# Patient Record
Sex: FEMALE | Race: WHITE | Employment: FULL TIME | URBAN - NONMETROPOLITAN AREA
[De-identification: names, ages, dates, MRNs, and addresses within clinical notes are randomized per-mention and may not be internally consistent; named-entity substitution may affect disease eponyms.]

---

## 2017-02-14 ENCOUNTER — OFFICE VISIT (OUTPATIENT)
Dept: ORTHOPEDIC SURGERY | Age: 52
End: 2017-02-14

## 2017-02-14 VITALS
BODY MASS INDEX: 33.77 KG/M2 | WEIGHT: 172 LBS | SYSTOLIC BLOOD PRESSURE: 118 MMHG | HEIGHT: 60 IN | HEART RATE: 66 BPM | DIASTOLIC BLOOD PRESSURE: 80 MMHG

## 2017-02-14 DIAGNOSIS — M23.92 INTERNAL DERANGEMENT OF LEFT KNEE: Primary | ICD-10-CM

## 2017-02-14 PROCEDURE — 99212 OFFICE O/P EST SF 10 MIN: CPT | Performed by: PHYSICIAN ASSISTANT

## 2017-02-28 ENCOUNTER — TELEPHONE (OUTPATIENT)
Dept: ORTHOPEDIC SURGERY | Age: 52
End: 2017-02-28

## 2017-03-06 ENCOUNTER — OFFICE VISIT (OUTPATIENT)
Dept: ORTHOPEDIC SURGERY | Age: 52
End: 2017-03-06

## 2017-03-06 VITALS — BODY MASS INDEX: 33.77 KG/M2 | HEIGHT: 60 IN | WEIGHT: 172 LBS

## 2017-03-06 DIAGNOSIS — M23.92 INTERNAL DERANGEMENT OF LEFT KNEE: Primary | ICD-10-CM

## 2017-03-06 DIAGNOSIS — Z01.818 PRE-OP EXAMINATION: Primary | ICD-10-CM

## 2017-03-06 DIAGNOSIS — Z01.818 PRE-OP EXAMINATION: ICD-10-CM

## 2017-03-06 PROCEDURE — 93000 ELECTROCARDIOGRAM COMPLETE: CPT | Performed by: INTERNAL MEDICINE

## 2017-03-06 PROCEDURE — 99213 OFFICE O/P EST LOW 20 MIN: CPT | Performed by: PHYSICIAN ASSISTANT

## 2017-03-24 ENCOUNTER — EVALUATION (OUTPATIENT)
Dept: PHYSICAL THERAPY | Age: 52
End: 2017-03-24
Payer: COMMERCIAL

## 2017-03-24 DIAGNOSIS — M25.562 LEFT KNEE PAIN, UNSPECIFIED CHRONICITY: Primary | ICD-10-CM

## 2017-03-24 PROCEDURE — 97014 ELECTRIC STIMULATION THERAPY: CPT | Performed by: PHYSICAL THERAPIST

## 2017-03-24 PROCEDURE — 97110 THERAPEUTIC EXERCISES: CPT | Performed by: PHYSICAL THERAPIST

## 2017-03-24 PROCEDURE — 97162 PT EVAL MOD COMPLEX 30 MIN: CPT | Performed by: PHYSICAL THERAPIST

## 2017-03-24 ASSESSMENT — PAIN DESCRIPTION - PAIN TYPE: TYPE: ACUTE PAIN

## 2017-03-24 ASSESSMENT — PAIN DESCRIPTION - ONSET: ONSET: SUDDEN

## 2017-03-24 ASSESSMENT — PAIN DESCRIPTION - ORIENTATION: ORIENTATION: LEFT;LOWER;MID;OUTER

## 2017-03-24 ASSESSMENT — PAIN DESCRIPTION - LOCATION: LOCATION: KNEE

## 2017-03-24 ASSESSMENT — PAIN SCALES - GENERAL: PAINLEVEL_OUTOF10: 6

## 2017-03-24 ASSESSMENT — PAIN DESCRIPTION - DESCRIPTORS: DESCRIPTORS: ACHING

## 2017-03-24 ASSESSMENT — PAIN DESCRIPTION - FREQUENCY: FREQUENCY: INTERMITTENT

## 2017-03-24 ASSESSMENT — PAIN DESCRIPTION - PROGRESSION: CLINICAL_PROGRESSION: NOT CHANGED

## 2017-03-27 ENCOUNTER — TREATMENT (OUTPATIENT)
Dept: PHYSICAL THERAPY | Age: 52
End: 2017-03-27
Payer: COMMERCIAL

## 2017-03-27 DIAGNOSIS — M25.562 LEFT KNEE PAIN, UNSPECIFIED CHRONICITY: Primary | ICD-10-CM

## 2017-03-27 PROCEDURE — 97110 THERAPEUTIC EXERCISES: CPT | Performed by: PHYSICAL THERAPIST

## 2017-03-27 PROCEDURE — 97014 ELECTRIC STIMULATION THERAPY: CPT | Performed by: PHYSICAL THERAPIST

## 2017-03-31 ENCOUNTER — TREATMENT (OUTPATIENT)
Dept: PHYSICAL THERAPY | Age: 52
End: 2017-03-31
Payer: COMMERCIAL

## 2017-03-31 DIAGNOSIS — M25.562 LEFT KNEE PAIN, UNSPECIFIED CHRONICITY: Primary | ICD-10-CM

## 2017-03-31 PROCEDURE — 97110 THERAPEUTIC EXERCISES: CPT | Performed by: PHYSICAL THERAPIST

## 2017-04-04 ENCOUNTER — TREATMENT (OUTPATIENT)
Dept: PHYSICAL THERAPY | Age: 52
End: 2017-04-04
Payer: COMMERCIAL

## 2017-04-04 DIAGNOSIS — M25.562 LEFT KNEE PAIN, UNSPECIFIED CHRONICITY: Primary | ICD-10-CM

## 2017-04-04 PROCEDURE — 97110 THERAPEUTIC EXERCISES: CPT | Performed by: PHYSICAL THERAPIST

## 2017-04-11 ENCOUNTER — TREATMENT (OUTPATIENT)
Dept: PHYSICAL THERAPY | Age: 52
End: 2017-04-11
Payer: COMMERCIAL

## 2017-04-11 DIAGNOSIS — M25.562 LEFT KNEE PAIN, UNSPECIFIED CHRONICITY: Primary | ICD-10-CM

## 2017-04-11 PROCEDURE — 97110 THERAPEUTIC EXERCISES: CPT | Performed by: PHYSICAL THERAPIST

## 2017-04-18 ENCOUNTER — TREATMENT (OUTPATIENT)
Dept: PHYSICAL THERAPY | Age: 52
End: 2017-04-18
Payer: COMMERCIAL

## 2017-04-18 DIAGNOSIS — M25.562 LEFT KNEE PAIN, UNSPECIFIED CHRONICITY: Primary | ICD-10-CM

## 2017-04-18 PROCEDURE — 97110 THERAPEUTIC EXERCISES: CPT | Performed by: PHYSICAL THERAPIST

## 2017-04-18 PROCEDURE — 97112 NEUROMUSCULAR REEDUCATION: CPT | Performed by: PHYSICAL THERAPIST

## 2017-05-02 ENCOUNTER — OFFICE VISIT (OUTPATIENT)
Dept: ORTHOPEDIC SURGERY | Age: 52
End: 2017-05-02

## 2017-05-02 VITALS — HEIGHT: 60 IN | WEIGHT: 170 LBS | BODY MASS INDEX: 33.38 KG/M2

## 2017-05-02 DIAGNOSIS — Z98.890 H/O ARTHROSCOPY OF LEFT KNEE: Primary | ICD-10-CM

## 2017-05-02 PROCEDURE — 99024 POSTOP FOLLOW-UP VISIT: CPT | Performed by: PHYSICIAN ASSISTANT

## 2017-05-02 RX ORDER — METHYLPREDNISOLONE 4 MG/1
4 TABLET ORAL SEE ADMIN INSTRUCTIONS
Qty: 1 KIT | Refills: 0 | Status: SHIPPED | OUTPATIENT
Start: 2017-05-02 | End: 2017-05-08

## 2018-01-18 DIAGNOSIS — M25.562 LEFT KNEE PAIN, UNSPECIFIED CHRONICITY: Primary | ICD-10-CM

## 2018-01-23 ENCOUNTER — OFFICE VISIT (OUTPATIENT)
Dept: ORTHOPEDIC SURGERY | Age: 53
End: 2018-01-23
Payer: COMMERCIAL

## 2018-01-23 ENCOUNTER — HOSPITAL ENCOUNTER (OUTPATIENT)
Dept: GENERAL RADIOLOGY | Age: 53
Discharge: HOME | End: 2018-01-23
Payer: COMMERCIAL

## 2018-01-23 VITALS
HEIGHT: 60 IN | BODY MASS INDEX: 35.89 KG/M2 | DIASTOLIC BLOOD PRESSURE: 78 MMHG | HEART RATE: 80 BPM | SYSTOLIC BLOOD PRESSURE: 107 MMHG | WEIGHT: 182.8 LBS

## 2018-01-23 DIAGNOSIS — M17.12 PRIMARY OSTEOARTHRITIS OF LEFT KNEE: Primary | ICD-10-CM

## 2018-01-23 DIAGNOSIS — M25.562 LEFT KNEE PAIN, UNSPECIFIED CHRONICITY: ICD-10-CM

## 2018-01-23 PROCEDURE — 20610 DRAIN/INJ JOINT/BURSA W/O US: CPT | Performed by: NURSE PRACTITIONER

## 2018-01-23 PROCEDURE — 73562 X-RAY EXAM OF KNEE 3: CPT

## 2018-01-23 PROCEDURE — 99212 OFFICE O/P EST SF 10 MIN: CPT | Performed by: NURSE PRACTITIONER

## 2018-01-23 RX ORDER — BUPIVACAINE HYDROCHLORIDE 5 MG/ML
5 INJECTION, SOLUTION PERINEURAL ONCE
Status: COMPLETED | OUTPATIENT
Start: 2018-01-23 | End: 2018-01-23

## 2018-01-23 RX ORDER — TRIAMCINOLONE ACETONIDE 40 MG/ML
80 INJECTION, SUSPENSION INTRA-ARTICULAR; INTRAMUSCULAR ONCE
Status: COMPLETED | OUTPATIENT
Start: 2018-01-23 | End: 2018-01-23

## 2018-01-23 RX ORDER — ALBUTEROL SULFATE 90 UG/1
2 AEROSOL, METERED RESPIRATORY (INHALATION) EVERY 6 HOURS PRN
COMMUNITY
End: 2018-08-09

## 2018-01-23 RX ADMIN — TRIAMCINOLONE ACETONIDE 80 MG: 40 INJECTION, SUSPENSION INTRA-ARTICULAR; INTRAMUSCULAR at 09:34

## 2018-01-23 RX ADMIN — BUPIVACAINE HYDROCHLORIDE 25 MG: 5 INJECTION, SOLUTION PERINEURAL at 09:34

## 2018-04-24 ENCOUNTER — OFFICE VISIT (OUTPATIENT)
Dept: ORTHOPEDIC SURGERY | Age: 53
End: 2018-04-24
Payer: COMMERCIAL

## 2018-04-24 VITALS
SYSTOLIC BLOOD PRESSURE: 130 MMHG | WEIGHT: 182 LBS | HEIGHT: 60 IN | HEART RATE: 74 BPM | DIASTOLIC BLOOD PRESSURE: 84 MMHG | BODY MASS INDEX: 35.73 KG/M2

## 2018-04-24 DIAGNOSIS — M17.12 PRIMARY OSTEOARTHRITIS OF LEFT KNEE: Primary | ICD-10-CM

## 2018-04-24 PROCEDURE — 99212 OFFICE O/P EST SF 10 MIN: CPT | Performed by: PHYSICIAN ASSISTANT

## 2018-04-24 PROCEDURE — 20610 DRAIN/INJ JOINT/BURSA W/O US: CPT | Performed by: PHYSICIAN ASSISTANT

## 2018-04-24 RX ORDER — BUPIVACAINE HYDROCHLORIDE 5 MG/ML
5 INJECTION, SOLUTION PERINEURAL ONCE
Status: COMPLETED | OUTPATIENT
Start: 2018-04-24 | End: 2018-04-24

## 2018-04-24 RX ORDER — TRIAMCINOLONE ACETONIDE 40 MG/ML
80 INJECTION, SUSPENSION INTRA-ARTICULAR; INTRAMUSCULAR ONCE
Status: COMPLETED | OUTPATIENT
Start: 2018-04-24 | End: 2018-04-24

## 2018-04-24 RX ADMIN — TRIAMCINOLONE ACETONIDE 80 MG: 40 INJECTION, SUSPENSION INTRA-ARTICULAR; INTRAMUSCULAR at 08:09

## 2018-04-24 RX ADMIN — BUPIVACAINE HYDROCHLORIDE 25 MG: 5 INJECTION, SOLUTION PERINEURAL at 08:09

## 2018-08-06 ENCOUNTER — OFFICE VISIT (OUTPATIENT)
Dept: PRIMARY CARE CLINIC | Age: 53
End: 2018-08-06
Payer: COMMERCIAL

## 2018-08-06 VITALS
SYSTOLIC BLOOD PRESSURE: 130 MMHG | WEIGHT: 176 LBS | BODY MASS INDEX: 34.55 KG/M2 | OXYGEN SATURATION: 99 % | HEIGHT: 60 IN | RESPIRATION RATE: 18 BRPM | DIASTOLIC BLOOD PRESSURE: 88 MMHG | HEART RATE: 98 BPM

## 2018-08-06 DIAGNOSIS — R11.0 NAUSEA: ICD-10-CM

## 2018-08-06 DIAGNOSIS — G43.009 MIGRAINE WITHOUT AURA AND WITHOUT STATUS MIGRAINOSUS, NOT INTRACTABLE: Primary | ICD-10-CM

## 2018-08-06 PROCEDURE — 99213 OFFICE O/P EST LOW 20 MIN: CPT | Performed by: NURSE PRACTITIONER

## 2018-08-06 PROCEDURE — 96372 THER/PROPH/DIAG INJ SC/IM: CPT | Performed by: NURSE PRACTITIONER

## 2018-08-06 RX ORDER — PROMETHAZINE HYDROCHLORIDE 25 MG/ML
12.5 INJECTION, SOLUTION INTRAMUSCULAR; INTRAVENOUS ONCE
Status: COMPLETED | OUTPATIENT
Start: 2018-08-06 | End: 2018-08-06

## 2018-08-06 RX ORDER — KETOROLAC TROMETHAMINE 30 MG/ML
30 INJECTION, SOLUTION INTRAMUSCULAR; INTRAVENOUS ONCE
Status: COMPLETED | OUTPATIENT
Start: 2018-08-06 | End: 2018-08-06

## 2018-08-06 RX ADMIN — PROMETHAZINE HYDROCHLORIDE 12.5 MG: 25 INJECTION, SOLUTION INTRAMUSCULAR; INTRAVENOUS at 12:11

## 2018-08-06 RX ADMIN — KETOROLAC TROMETHAMINE 30 MG: 30 INJECTION, SOLUTION INTRAMUSCULAR; INTRAVENOUS at 12:10

## 2018-08-06 ASSESSMENT — PATIENT HEALTH QUESTIONNAIRE - PHQ9
2. FEELING DOWN, DEPRESSED OR HOPELESS: 0
SUM OF ALL RESPONSES TO PHQ9 QUESTIONS 1 & 2: 0
1. LITTLE INTEREST OR PLEASURE IN DOING THINGS: 0
SUM OF ALL RESPONSES TO PHQ QUESTIONS 1-9: 0

## 2018-08-06 ASSESSMENT — ENCOUNTER SYMPTOMS
SINUS PRESSURE: 0
RHINORRHEA: 0
PHOTOPHOBIA: 1
SORE THROAT: 0
VOMITING: 1
ABDOMINAL PAIN: 0
RESPIRATORY NEGATIVE: 1
NAUSEA: 1

## 2018-08-06 NOTE — PROGRESS NOTES
Exam   Constitutional: She is oriented to person, place, and time. She appears well-developed and well-nourished. HENT:   Head: Normocephalic. Right Ear: External ear and ear canal normal. A middle ear effusion is present. Left Ear: External ear and ear canal normal. A middle ear effusion is present. Nose: Mucosal edema and rhinorrhea present. Right sinus exhibits no maxillary sinus tenderness and no frontal sinus tenderness. Left sinus exhibits no maxillary sinus tenderness and no frontal sinus tenderness. Mouth/Throat: Uvula is midline, oropharynx is clear and moist and mucous membranes are normal.   Eyes: Pupils are equal, round, and reactive to light. Neck: Normal range of motion. Neck supple. Cardiovascular: Normal rate, regular rhythm and normal heart sounds. Pulmonary/Chest: Effort normal and breath sounds normal.   Lymphadenopathy:     She has no cervical adenopathy. Neurological: She is alert and oriented to person, place, and time. GCS eye subscore is 4. GCS verbal subscore is 5. GCS motor subscore is 6. Skin: Skin is warm and dry. Psychiatric: She has a normal mood and affect. Her speech is normal and behavior is normal. Judgment and thought content normal. Cognition and memory are normal.   Nursing note and vitals reviewed. Assessment and Plan:     Diagnosis Orders   1. Migraine without aura and without status migrainosus, not intractable  promethazine (PHENERGAN) injection 12.5 mg    ketorolac (TORADOL) injection 30 mg   2. Nausea  promethazine (PHENERGAN) injection 12.5 mg     I suggested she take Excedrin migraine, tylenol, or ibuprofen at the start of a headache. Take Zyrtec daily for 3 weeks. I suggested to continue routine medications. Follow up with PCP if symptoms do not improve or worsen.       Electronically signed by PJ Frank CNP on 8/6/18 at 11:47 AM

## 2018-08-06 NOTE — LETTER
[facility]  [place]  Phone: [phone]  Fax: [phone]    PJ Frank CNP        August 6, 2018     Patient: Parrish Malone   YOB: 1965   Date of Visit: 8/6/2018       To Whom it May Concern:    Jose Rivera was seen in my clinic on 8/6/2018. She may return to work on 08/07/2018. Please excuse for 08/06/2018. If you have any questions or concerns, please don't hesitate to call.     Sincerely,         PJ Frank CNP

## 2018-08-06 NOTE — PATIENT INSTRUCTIONS
Patient Education        Migraine Headache: Care Instructions  Your Care Instructions  Migraines are painful, throbbing headaches that often start on one side of the head. They may cause nausea and vomiting and make you sensitive to light, sound, or smell. Without treatment, migraines can last from 4 hours to a few days. Medicines can help prevent migraines or stop them after they have started. Your doctor can help you find which ones work best for you. Follow-up care is a key part of your treatment and safety. Be sure to make and go to all appointments, and call your doctor if you are having problems. It's also a good idea to know your test results and keep a list of the medicines you take. How can you care for yourself at home? · Do not drive if you have taken a prescription pain medicine. · Rest in a quiet, dark room until your headache is gone. Close your eyes, and try to relax or go to sleep. Don't watch TV or read. · Put a cold, moist cloth or cold pack on the painful area for 10 to 20 minutes at a time. Put a thin cloth between the cold pack and your skin. · Use a warm, moist towel or a heating pad set on low to relax tight shoulder and neck muscles. · Have someone gently massage your neck and shoulders. · Take your medicines exactly as prescribed. Call your doctor if you think you are having a problem with your medicine. You will get more details on the specific medicines your doctor prescribes. · Be careful not to take pain medicine more often than the instructions allow. You could get worse or more frequent headaches when the medicine wears off. To prevent migraines  · Keep a headache diary so you can figure out what triggers your headaches. Avoiding triggers may help you prevent headaches. Record when each headache began, how long it lasted, and what the pain was like.  (Was it throbbing, aching, stabbing, or dull?) Write down any other symptoms you had with the headache, such as nausea, flashing lights or dark spots, or sensitivity to bright light or loud noise. Note if the headache occurred near your period. List anything that might have triggered the headache. Triggers may include certain foods (chocolate, cheese, wine) or odors, smoke, bright light, stress, or lack of sleep. · If your doctor has prescribed medicine for your migraines, take it as directed. You may have medicine that you take only when you get a migraine and medicine that you take all the time to help prevent migraines. ¨ If your doctor has prescribed medicine for when you get a headache, take it at the first sign of a migraine, unless your doctor has given you other instructions. ¨ If your doctor has prescribed medicine to prevent migraines, take it exactly as prescribed. Call your doctor if you think you are having a problem with your medicine. · Find healthy ways to deal with stress. Migraines are most common during or right after stressful times. Take time to relax before and after you do something that has caused a migraine in the past.  · Try to keep your muscles relaxed by keeping good posture. Check your jaw, face, neck, and shoulder muscles for tension. Try to relax them. When you sit at a desk, change positions often. And make sure to stretch for 30 seconds each hour. · Get plenty of sleep and exercise. · Eat meals on a regular schedule. Avoid foods and drinks that often trigger migraines. These include chocolate, alcohol (especially red wine and port), aspartame, monosodium glutamate (MSG), and some additives found in foods (such as hot dogs, fry, cold cuts, aged cheeses, and pickled foods). · Limit caffeine. Don't drink too much coffee, tea, or soda. But don't quit caffeine suddenly. That can also give you migraines. · Do not smoke or allow others to smoke around you. If you need help quitting, talk to your doctor about stop-smoking programs and medicines.  These can increase your chances of quitting for good.  · If you are taking birth control pills or hormone therapy, talk to your doctor about whether they are triggering your migraines. When should you call for help? Call 911 anytime you think you may need emergency care. For example, call if:    · You have signs of a stroke. These may include:  ¨ Sudden numbness, paralysis, or weakness in your face, arm, or leg, especially on only one side of your body. ¨ Sudden vision changes. ¨ Sudden trouble speaking. ¨ Sudden confusion or trouble understanding simple statements. ¨ Sudden problems with walking or balance. ¨ A sudden, severe headache that is different from past headaches.    Call your doctor now or seek immediate medical care if:    · You have new or worse nausea and vomiting.     · You have a new or higher fever.     · Your headache gets much worse.    Watch closely for changes in your health, and be sure to contact your doctor if:    · You are not getting better after 2 days (48 hours). Where can you learn more? Go to [email or web address]://[email or web address] org and sign in to your [employer] account. Enter Y126 in the Antidot box to learn more about \"Migraine Headache: Care Instructions. \"     If you do not have an account, please click on the \"Sign Up Now\" link. Current as of: October 9, 2017  Content Version: 11.6  © 3222-0564 [employer]. Care instructions adapted under license by [facility] ([facility]). If you have questions about a medical condition or this instruction, always ask your healthcare professional. [place] [address] any warranty or liability for your use of this information.

## 2018-08-09 ENCOUNTER — OFFICE VISIT (OUTPATIENT)
Dept: FAMILY MEDICINE CLINIC | Age: 53
End: 2018-08-09
Payer: COMMERCIAL

## 2018-08-09 VITALS
OXYGEN SATURATION: 97 % | DIASTOLIC BLOOD PRESSURE: 80 MMHG | BODY MASS INDEX: 35.14 KG/M2 | HEART RATE: 80 BPM | WEIGHT: 179 LBS | SYSTOLIC BLOOD PRESSURE: 118 MMHG | HEIGHT: 60 IN

## 2018-08-09 DIAGNOSIS — F32.A DEPRESSION, UNSPECIFIED DEPRESSION TYPE: Primary | ICD-10-CM

## 2018-08-09 DIAGNOSIS — F41.9 ANXIETY: ICD-10-CM

## 2018-08-09 DIAGNOSIS — G43.009 MIGRAINE WITHOUT AURA AND WITHOUT STATUS MIGRAINOSUS, NOT INTRACTABLE: ICD-10-CM

## 2018-08-09 DIAGNOSIS — Z00.00 ROUTINE HEALTH MAINTENANCE: ICD-10-CM

## 2018-08-09 PROCEDURE — 99214 OFFICE O/P EST MOD 30 MIN: CPT | Performed by: NURSE PRACTITIONER

## 2018-08-09 RX ORDER — PAROXETINE HYDROCHLORIDE 20 MG/1
20 TABLET, FILM COATED ORAL DAILY
Qty: 30 TABLET | Refills: 3 | Status: SHIPPED | OUTPATIENT
Start: 2018-08-09 | End: 2018-10-02 | Stop reason: SDUPTHER

## 2018-08-09 RX ORDER — PAROXETINE 10 MG/1
10 TABLET, FILM COATED ORAL DAILY
Qty: 7 TABLET | Refills: 0 | Status: SHIPPED | OUTPATIENT
Start: 2018-08-09 | End: 2018-08-16

## 2018-08-09 RX ORDER — FLUOXETINE HYDROCHLORIDE 20 MG/1
20 CAPSULE ORAL DAILY
Qty: 30 CAPSULE | Refills: 1 | Status: CANCELLED | OUTPATIENT
Start: 2018-08-09

## 2018-08-09 RX ORDER — FLUOXETINE HYDROCHLORIDE 20 MG/1
CAPSULE ORAL
Qty: 30 CAPSULE | Refills: 3 | Status: CANCELLED | OUTPATIENT
Start: 2018-08-09

## 2018-08-09 RX ORDER — SUMATRIPTAN 100 MG/1
100 TABLET, FILM COATED ORAL
Qty: 9 TABLET | Refills: 0 | Status: SHIPPED | OUTPATIENT
Start: 2018-08-09 | End: 2018-11-06

## 2018-08-09 ASSESSMENT — ENCOUNTER SYMPTOMS
ALLERGIC/IMMUNOLOGIC NEGATIVE: 1
DIARRHEA: 0
SINUS PRESSURE: 0
EYES NEGATIVE: 1
SHORTNESS OF BREATH: 0
NAUSEA: 0
CHEST TIGHTNESS: 0
TROUBLE SWALLOWING: 0
COUGH: 0
ABDOMINAL PAIN: 0
CONSTIPATION: 0
VOMITING: 0

## 2018-08-09 NOTE — PROGRESS NOTES
TONSILLECTOMY      TUBAL LIGATION      UPPER GASTROINTESTINAL ENDOSCOPY  05/15/2013    small hiatal hernia       Family History   Problem Relation Age of Onset    High Blood Pressure Mother     Diabetes Father     Cancer Sister         breast    Cancer Maternal Aunt         breast    Cancer Paternal Uncle         colon    Cancer Paternal Grandmother         breast    Cancer Paternal Grandfather         leukemia    Glaucoma Neg Hx        Allergies   Allergen Reactions    Codeine Nausea And Vomiting    Codeine Nausea And Vomiting       Current Outpatient Prescriptions   Medication Sig Dispense Refill    SUMAtriptan (IMITREX) 100 MG tablet Take 1 tablet by mouth once as needed for Migraine One  Tablet    Twice  Daily  As  needed 9 tablet 0    PARoxetine (PAXIL) 10 MG tablet Take 1 tablet by mouth daily for 7 days 7 tablet 0    PARoxetine (PAXIL) 20 MG tablet Take 1 tablet by mouth daily 30 tablet 3     No current facility-administered medications for this visit. Review of Systems   Constitutional: Negative for activity change, appetite change and fever. HENT: Negative for congestion, ear pain, sinus pressure and trouble swallowing. Eyes: Negative. Respiratory: Negative for cough, chest tightness and shortness of breath. Cardiovascular: Negative for chest pain and palpitations. Gastrointestinal: Negative for abdominal pain, constipation, diarrhea, nausea and vomiting. Endocrine: Negative. Genitourinary: Negative for difficulty urinating and dysuria. Musculoskeletal: Negative. Skin: Negative. Allergic/Immunologic: Negative. Neurological: Negative for dizziness, light-headedness and headaches. Hematological: Negative. Psychiatric/Behavioral: Negative. Objective:      /80   Pulse 80   Ht 5' (1.524 m)   Wt 179 lb (81.2 kg)   SpO2 97%   BMI 34.96 kg/m²     Physical Exam   Constitutional: She is oriented to person, place, and time.  She appears well-developed and well-nourished. HENT:   Head: Normocephalic and atraumatic. Right Ear: Hearing, tympanic membrane and external ear normal.   Left Ear: Hearing, tympanic membrane and external ear normal.   Nose: Nose normal.   Mouth/Throat: Uvula is midline, oropharynx is clear and moist and mucous membranes are normal.   Eyes: Conjunctivae and EOM are normal. Pupils are equal, round, and reactive to light. Neck: Normal range of motion. Neck supple. Cardiovascular: Normal rate, regular rhythm, normal heart sounds and intact distal pulses. Pulmonary/Chest: Effort normal and breath sounds normal.   Abdominal: Soft. Bowel sounds are normal.   Musculoskeletal: Normal range of motion. Neurological: She is alert and oriented to person, place, and time. Skin: Skin is warm and dry. Psychiatric: She has a normal mood and affect. Her behavior is normal. Judgment and thought content normal.         Assessment & Plan:      1. Migraine without aura and without status migrainosus, not intractable-chronic unstable recurrent    - SUMAtriptan (IMITREX) 100 MG tablet; Take 1 tablet by mouth once as needed for Migraine One  Tablet    Twice  Daily  As  needed  Dispense: 9 tablet; Refill: Mike Espino MD, [facility]    2. Depression, unspecified depression type-chronic unstable  Trial medication.  - PARoxetine (PAXIL) 10 MG tablet; Take 1 tablet by mouth daily for 7 days  Dispense: 7 tablet; Refill: 0  - PARoxetine (PAXIL) 20 MG tablet; Take 1 tablet by mouth daily  Dispense: 30 tablet; Refill: 3    3. Anxiety-chronic unstable    - PARoxetine (PAXIL) 10 MG tablet; Take 1 tablet by mouth daily for 7 days  Dispense: 7 tablet; Refill: 0  - PARoxetine (PAXIL) 20 MG tablet; Take 1 tablet by mouth daily  Dispense: 30 tablet; Refill: 3    4. Routine health maintenance    - CBC With Auto Differential; Future  - Comprehensive Metabolic Panel; Future  - Lipid Panel; Future  - TSH;  Future  - T4, Free; Future    Answered all of the patient's questions. Agrees with plan of care. Follow up with PCP in 3 months or sooner if needed for follow up.        PJ Cadena - CNP  8/9/2018 3:37 PM

## 2018-08-31 ENCOUNTER — OFFICE VISIT (OUTPATIENT)
Dept: FAMILY MEDICINE CLINIC | Age: 53
End: 2018-08-31
Payer: COMMERCIAL

## 2018-08-31 VITALS
OXYGEN SATURATION: 92 % | DIASTOLIC BLOOD PRESSURE: 80 MMHG | HEIGHT: 60 IN | SYSTOLIC BLOOD PRESSURE: 118 MMHG | HEART RATE: 88 BPM | BODY MASS INDEX: 34.75 KG/M2 | TEMPERATURE: 98.7 F | WEIGHT: 177 LBS

## 2018-08-31 DIAGNOSIS — Z13.31 POSITIVE DEPRESSION SCREENING: ICD-10-CM

## 2018-08-31 DIAGNOSIS — F43.21 GRIEF REACTION: Primary | ICD-10-CM

## 2018-08-31 PROCEDURE — 99214 OFFICE O/P EST MOD 30 MIN: CPT | Performed by: FAMILY MEDICINE

## 2018-08-31 PROCEDURE — G8431 POS CLIN DEPRES SCRN F/U DOC: HCPCS | Performed by: FAMILY MEDICINE

## 2018-08-31 PROCEDURE — G0444 DEPRESSION SCREEN ANNUAL: HCPCS | Performed by: FAMILY MEDICINE

## 2018-08-31 RX ORDER — M-VIT,TX,IRON,MINS/CALC/FOLIC 27MG-0.4MG
2 TABLET ORAL DAILY
COMMUNITY

## 2018-08-31 RX ORDER — CLONAZEPAM 0.5 MG/1
0.5 TABLET ORAL 2 TIMES DAILY PRN
Qty: 60 TABLET | Refills: 0 | Status: SHIPPED | OUTPATIENT
Start: 2018-08-31 | End: 2018-11-06

## 2018-08-31 RX ORDER — OMEPRAZOLE 20 MG/1
20 CAPSULE, DELAYED RELEASE ORAL DAILY
COMMUNITY

## 2018-08-31 RX ORDER — TRAZODONE HYDROCHLORIDE 50 MG/1
50 TABLET ORAL NIGHTLY
Qty: 30 TABLET | Refills: 2 | Status: SHIPPED | OUTPATIENT
Start: 2018-08-31 | End: 2018-10-29 | Stop reason: ALTCHOICE

## 2018-08-31 RX ORDER — CETIRIZINE HYDROCHLORIDE 10 MG/1
10 TABLET ORAL DAILY
COMMUNITY

## 2018-08-31 ASSESSMENT — PATIENT HEALTH QUESTIONNAIRE - PHQ9
7. TROUBLE CONCENTRATING ON THINGS, SUCH AS READING THE NEWSPAPER OR WATCHING TELEVISION: 3
9. THOUGHTS THAT YOU WOULD BE BETTER OFF DEAD, OR OF HURTING YOURSELF: 0
10. IF YOU CHECKED OFF ANY PROBLEMS, HOW DIFFICULT HAVE THESE PROBLEMS MADE IT FOR YOU TO DO YOUR WORK, TAKE CARE OF THINGS AT HOME, OR GET ALONG WITH OTHER PEOPLE: 3
2. FEELING DOWN, DEPRESSED OR HOPELESS: 3
SUM OF ALL RESPONSES TO PHQ QUESTIONS 1-9: 23
SUM OF ALL RESPONSES TO PHQ9 QUESTIONS 1 & 2: 6
8. MOVING OR SPEAKING SO SLOWLY THAT OTHER PEOPLE COULD HAVE NOTICED. OR THE OPPOSITE, BEING SO FIGETY OR RESTLESS THAT YOU HAVE BEEN MOVING AROUND A LOT MORE THAN USUAL: 3
SUM OF ALL RESPONSES TO PHQ QUESTIONS 1-9: 23
6. FEELING BAD ABOUT YOURSELF - OR THAT YOU ARE A FAILURE OR HAVE LET YOURSELF OR YOUR FAMILY DOWN: 2
3. TROUBLE FALLING OR STAYING ASLEEP: 3
4. FEELING TIRED OR HAVING LITTLE ENERGY: 3
5. POOR APPETITE OR OVEREATING: 3
1. LITTLE INTEREST OR PLEASURE IN DOING THINGS: 3

## 2018-08-31 ASSESSMENT — ENCOUNTER SYMPTOMS
ABDOMINAL PAIN: 1
COUGH: 0
CONSTIPATION: 0
CHEST TIGHTNESS: 0
DIARRHEA: 0
WHEEZING: 0
SHORTNESS OF BREATH: 1
NAUSEA: 0

## 2018-08-31 NOTE — PROGRESS NOTES
Packs/day: 1.00     Start date: 8/3/2015    Smokeless tobacco: Never Used    Alcohol use Yes      Comment: rare      Current Outpatient Prescriptions   Medication Sig Dispense Refill    Multiple Vitamins-Minerals (THERAPEUTIC MULTIVITAMIN-MINERALS) tablet Take 2 tablets by mouth daily      POTASSIUM GLUCONATE ER PO Take 1 tablet by mouth daily      omeprazole (PRILOSEC) 20 MG delayed release capsule Take 20 mg by mouth daily      cetirizine (ZYRTEC) 10 MG tablet Take 10 mg by mouth daily      traZODone (DESYREL) 50 MG tablet Take 1 tablet by mouth nightly 30 tablet 2    clonazePAM (KLONOPIN) 0.5 MG tablet Take 1 tablet by mouth 2 times daily as needed for Anxiety for up to 30 days. . 60 tablet 0    PARoxetine (PAXIL) 20 MG tablet Take 1 tablet by mouth daily 30 tablet 3    SUMAtriptan (IMITREX) 100 MG tablet Take 1 tablet by mouth once as needed for Migraine One  Tablet    Twice  Daily  As  needed 9 tablet 0     No current facility-administered medications for this visit. Allergies   Allergen Reactions    Codeine Nausea And Vomiting    Codeine Nausea And Vomiting       Subjective:      Review of Systems   Constitutional: Positive for activity change, appetite change and fatigue. Negative for fever. Eyes: Negative for visual disturbance. Respiratory: Positive for shortness of breath (during panic attacks). Negative for cough, chest tightness and wheezing. Cardiovascular: Positive for palpitations (during panic attacks). Negative for chest pain and leg swelling. Gastrointestinal: Positive for abdominal pain. Negative for constipation, diarrhea and nausea. Skin: Negative for rash. Neurological: Positive for headaches. Negative for dizziness and syncope. Psychiatric/Behavioral: Positive for decreased concentration, dysphoric mood and sleep disturbance. Negative for hallucinations, self-injury and suicidal ideas. The patient is nervous/anxious.         Objective:     Physical Exam Constitutional: She is oriented to person, place, and time. She appears well-developed and well-nourished. No distress. Eyes: Pupils are equal, round, and reactive to light. Conjunctivae and EOM are normal.   Neck: Normal range of motion. Neck supple. No thyromegaly present. Cardiovascular: Normal rate, regular rhythm, normal heart sounds and intact distal pulses. No murmur heard. Pulmonary/Chest: Effort normal and breath sounds normal. No respiratory distress. She has no wheezes. Musculoskeletal: She exhibits no edema. Lymphadenopathy:     She has no cervical adenopathy. Neurological: She is alert and oriented to person, place, and time. Skin: Skin is warm and dry. No rash noted. No erythema. Psychiatric: Her speech is normal. Judgment and thought content normal. She is withdrawn. Cognition and memory are normal. She exhibits a depressed mood. Nursing note and vitals reviewed. /80 (Site: Left Arm, Position: Sitting, Cuff Size: Medium Adult)   Pulse 88   Temp 98.7 °F (37.1 °C) (Tympanic)   Ht 5' (1.524 m)   Wt 177 lb (80.3 kg)   SpO2 92%   BMI 34.57 kg/m²     Assessment:       Diagnosis Orders   1. Grief reaction  clonazePAM (KLONOPIN) 0.5 MG tablet             Plan:       Grief reaction: new; I will treat her sleeping difficulties with trazodone and I will treat her panic attacks with klonapin for now. She was told the klonapin will be for a short time while she is getting over the shock of losing her . On the basis of positive PHQ-9 screening (PHQ-9 Total Score: 23), the following plan was implemented: medication prescribed: benzodiazepine- klonapin and sleep aid- trazodone- patient will call for any significant medication side effects or worsening symptoms of depression. Patient will follow-up in 1 month(s) with PCP. Return in about 1 month (around 9/30/2018) for Depression follow up, Anxiety follow up.     Orders Placed This Encounter   Medications    traZODone (DESYREL) 50 MG tablet     Sig: Take 1 tablet by mouth nightly     Dispense:  30 tablet     Refill:  2    clonazePAM (KLONOPIN) 0.5 MG tablet     Sig: Take 1 tablet by mouth 2 times daily as needed for Anxiety for up to 30 days. .     Dispense:  60 tablet     Refill:  0       Patient given educational materials - see patient instructions. Discussed use, benefit, and side effects of prescribed medications. All patient questions answered. Pt voiced understanding. Reviewed health maintenance. Instructed to continue current medications, diet and exercise. Patient agreed with treatment plan. Follow up as directed.      Electronically signed by Tamika Boyd MD on 8/31/2018 at 1:02 PM

## 2018-09-05 ENCOUNTER — TELEPHONE (OUTPATIENT)
Dept: FAMILY MEDICINE CLINIC | Age: 53
End: 2018-09-05

## 2018-09-05 NOTE — TELEPHONE ENCOUNTER
Guille Suarez from insurance received her FMLA paperwork was wondering how long you wanted her off work - please let me know and I can her Moniqueemily Erick from insurance back [phone].

## 2018-09-11 ENCOUNTER — TELEPHONE (OUTPATIENT)
Dept: FAMILY MEDICINE CLINIC | Age: 53
End: 2018-09-11

## 2018-09-11 NOTE — TELEPHONE ENCOUNTER
Spoke to Dr Mara Matute and she stated she had told her in the room she could take up to 2 So. . I lm  if she has already tried that, go ahead and take 3 at night which would be a total of 150mg, and then give us a call on Frday and let us know how she is doing.  [phone]

## 2018-09-14 ENCOUNTER — TELEPHONE (OUTPATIENT)
Dept: FAMILY MEDICINE CLINIC | Age: 53
End: 2018-09-14

## 2018-09-14 NOTE — TELEPHONE ENCOUNTER
Stated first night it worked with in an hour, the next few nights it took 3 hours to kick in. She stated she is taking 3 pills at this time. I asked her when she was taking them and she stated around 9:30 pm to 10 pm. I suggested taking them around 8-8:30 pm. I asked her if she was having caffeine close to bed time. She stated yes pop. I suggested switching to caffeine free at least closer to bed time. She asked if it was normal it taking 3 hours to kick in.

## 2018-10-02 ENCOUNTER — OFFICE VISIT (OUTPATIENT)
Dept: FAMILY MEDICINE CLINIC | Age: 53
End: 2018-10-02

## 2018-10-02 VITALS
WEIGHT: 171 LBS | BODY MASS INDEX: 33.57 KG/M2 | SYSTOLIC BLOOD PRESSURE: 130 MMHG | DIASTOLIC BLOOD PRESSURE: 80 MMHG | HEIGHT: 60 IN | RESPIRATION RATE: 16 BRPM | HEART RATE: 64 BPM

## 2018-10-02 DIAGNOSIS — F43.21 GRIEF REACTION: Primary | ICD-10-CM

## 2018-10-02 DIAGNOSIS — F32.A DEPRESSION, UNSPECIFIED DEPRESSION TYPE: ICD-10-CM

## 2018-10-02 DIAGNOSIS — F41.9 ANXIETY: ICD-10-CM

## 2018-10-02 PROCEDURE — 99213 OFFICE O/P EST LOW 20 MIN: CPT | Performed by: FAMILY MEDICINE

## 2018-10-02 RX ORDER — PAROXETINE 30 MG/1
30 TABLET, FILM COATED ORAL DAILY
Qty: 30 TABLET | Refills: 5 | Status: SHIPPED | OUTPATIENT
Start: 2018-10-02

## 2018-10-05 ASSESSMENT — ENCOUNTER SYMPTOMS
GASTROINTESTINAL NEGATIVE: 1
EYES NEGATIVE: 1
RESPIRATORY NEGATIVE: 1

## 2018-10-05 NOTE — PROGRESS NOTES
10/2/2018     Jeffrey Mccarthy (:  1965) is a 47 y.o. female, here for evaluation of the following medical concerns:    HPI  Patient comes in today for follow up of ongoing issues with anxiety and grief. Chief Complaint   Patient presents with    Anxiety     1 mo f/u; grief reaction-; some good days and bad days; is sleeping a lot; meds do seem to be helping; trazodone doesn't really help; is supposed to return to work tomorrow but feels she is not yet ready   . Patient Does come in today for one-month follow-up of her anxiety and grief reaction.  was abruptly killed in a motor vehicle accident in August and she has been dealing with grief ever since. She was started on Paxil which she feels has helped. Was started on trazodone but really doesn't find that this provides her with much benefit. Ultimately he is not taking this medication. States that she has good days and bad days. Still not sure that she is ready to return to work as she is not able to focus and concentrate and is concerned about being able to perform her routine work duties. States that she just has very little desire to get up and do anything. Notes that she wants to sleep frequently. Doesn't seem to have issues falling asleep or staying asleep. Actually feels like she wants to sleep all the time. Doesn't note any suicidal ideation. Doesn't get easily agitated or frustrated. Just becomes very tearful. Sometimes having panic type episodes. Otherwise today no other acute medical concerns. .   Other review of systems are as noted below. Did review patient's med list, allergies, social history, fam history, pmhx and pshx today as noted in the record. Preventative measures are reviewed today. See health maintenance section for complete preventative plan of care. Review of Systems   Constitutional: Negative. HENT: Negative. Eyes: Negative. Respiratory: Negative. Cardiovascular: Negative. atraumatic. Eyes: Conjunctivae are normal.   Neck: Normal range of motion. Pulmonary/Chest: Effort normal.   Neurological: She is alert and oriented to person, place, and time. Skin: Skin is warm and dry. No rash noted. She is not diaphoretic. No erythema. No pallor. Psychiatric: Her behavior is normal. Judgment and thought content normal. Her mood appears anxious. She exhibits a depressed mood. Tearful at times. Nursing note and vitals reviewed. ASSESSMENT/PLAN:  1. Grief reaction  I do feel that patient would benefit from counseling. Can make referral to Dr. Tahmina Gama for initial eval and may need further referral for more intensive therapy. - [id number] [facility] Arcelia Betancourt PsyD    2. Depression, unspecified depression type  Will increase her paxil. Seems to be tolerating this medication and I feel that an increased dose would be beneficial  - PARoxetine (PAXIL) 30 MG tablet; Take 1 tablet by mouth daily  Dispense: 30 tablet; Refill: 5  - [facility] Pawan Mireles PsyD    3. Anxiety  As above  - PARoxetine (PAXIL) 30 MG tablet; Take 1 tablet by mouth daily  Dispense: 30 tablet; Refill: 5  - [facility] Pawan Mireles PsyD    Patient is given off work note for an additional month. Will have her follow up at that time to determine ability to return to work duties. May return sooner if any further problems or symptoms arise. (Please note that portions of this note were completed with a voice recognition program. Efforts were made to edit the dictations but occasionally words are mis-transcribed.)      No Follow-up on file. An electronic signature was used to authenticate this note.     --Corina Boone DO on 10/5/2018 at 8:33 AM

## 2018-10-29 ENCOUNTER — OFFICE VISIT (OUTPATIENT)
Dept: FAMILY MEDICINE CLINIC | Age: 53
End: 2018-10-29

## 2018-10-29 VITALS
OXYGEN SATURATION: 98 % | WEIGHT: 176 LBS | HEART RATE: 91 BPM | BODY MASS INDEX: 34.55 KG/M2 | DIASTOLIC BLOOD PRESSURE: 80 MMHG | SYSTOLIC BLOOD PRESSURE: 122 MMHG | HEIGHT: 60 IN

## 2018-10-29 DIAGNOSIS — G43.909 MIGRAINE WITHOUT STATUS MIGRAINOSUS, NOT INTRACTABLE, UNSPECIFIED MIGRAINE TYPE: ICD-10-CM

## 2018-10-29 DIAGNOSIS — F41.9 ANXIETY AND DEPRESSION: ICD-10-CM

## 2018-10-29 DIAGNOSIS — Z00.00 ROUTINE HEALTH MAINTENANCE: Primary | ICD-10-CM

## 2018-10-29 DIAGNOSIS — J30.9 ALLERGIC RHINITIS, UNSPECIFIED SEASONALITY, UNSPECIFIED TRIGGER: ICD-10-CM

## 2018-10-29 DIAGNOSIS — Z12.4 CERVICAL CANCER SCREENING: ICD-10-CM

## 2018-10-29 DIAGNOSIS — J45.20 MILD INTERMITTENT EXTRINSIC ASTHMA WITHOUT COMPLICATION: ICD-10-CM

## 2018-10-29 DIAGNOSIS — F32.A ANXIETY AND DEPRESSION: ICD-10-CM

## 2018-10-29 DIAGNOSIS — Z76.89 ENCOUNTER TO ESTABLISH CARE: ICD-10-CM

## 2018-10-29 DIAGNOSIS — K21.9 GASTROESOPHAGEAL REFLUX DISEASE, ESOPHAGITIS PRESENCE NOT SPECIFIED: ICD-10-CM

## 2018-10-29 PROCEDURE — 99386 PREV VISIT NEW AGE 40-64: CPT | Performed by: NURSE PRACTITIONER

## 2018-10-29 RX ORDER — CHOLECALCIFEROL (VITAMIN D3) 125 MCG
500 CAPSULE ORAL DAILY
COMMUNITY

## 2018-10-29 RX ORDER — ALBUTEROL SULFATE 90 UG/1
2 AEROSOL, METERED RESPIRATORY (INHALATION) EVERY 6 HOURS PRN
Qty: 1 INHALER | Refills: 3 | Status: SHIPPED | OUTPATIENT
Start: 2018-10-29

## 2018-10-29 ASSESSMENT — ENCOUNTER SYMPTOMS
COUGH: 0
SINUS PRESSURE: 0
NAUSEA: 0
ALLERGIC/IMMUNOLOGIC NEGATIVE: 1
DIARRHEA: 0
TROUBLE SWALLOWING: 0
EYES NEGATIVE: 1
CHEST TIGHTNESS: 0
VOMITING: 0
SHORTNESS OF BREATH: 0
CONSTIPATION: 0
ABDOMINAL PAIN: 0

## 2018-10-29 NOTE — PROGRESS NOTES
10/29/2018    Jeffrey Mccarthy (:  1965) is a 47 y.o. female, here for evaluation of the following medical concerns: to establish    HPI  Patient voices concerns regarding her FMLA related to her depression and anxiety. He husbands passed suddenly a few months ago. She has been off of work completely for this until November the 2nd. She is requesting PRN FMLA for panic attacks. She states that it is typically about once a week or less. She states that when she gets she does supportive care at home to help reduce the panic attacks then she can typically recover by the next day. She continues on the Paxil and is doing well on this. She is not taking the trazodone. She states that she is using the klonopin sparingly. She takes prilosec for acid reflux daily and she samaniego snot have any episodes of regurgitation. She is on PRN zyrtec for her seasonal allergies and this helps her when she needs it. She is due for lab work and is agreeable to this. She states that she has she does use an PRN MDI inhaler for allergy induced asthma. She states that she also has imitrex for her migraines. She states that most of the time she uses IBU with effect. She needs referral for OB/GYN today as well. Review of Systems   Constitutional: Negative for activity change, appetite change and fever. HENT: Negative for congestion, ear pain, sinus pressure and trouble swallowing. Eyes: Negative. Respiratory: Negative for cough, chest tightness and shortness of breath. Cardiovascular: Negative for chest pain and palpitations. Gastrointestinal: Negative for abdominal pain, constipation, diarrhea, nausea and vomiting. Endocrine: Negative. Genitourinary: Negative for difficulty urinating and dysuria. Musculoskeletal: Negative. Skin: Negative. Allergic/Immunologic: Negative. Neurological: Negative for dizziness, light-headedness and headaches. Hematological: Negative.     Psychiatric/Behavioral: Negative. Prior to Visit Medications    Medication Sig Taking? Authorizing Provider   vitamin B-12 (CYANOCOBALAMIN) 500 MCG tablet Take 500 mcg by mouth daily Yes Historical Provider, MD   albuterol sulfate HFA (VENTOLIN HFA) 108 (90 Base) MCG/ACT inhaler Inhale 2 puffs into the lungs every 6 hours as needed for Wheezing Yes Kylee Morris APRN - CNP   PARoxetine (PAXIL) 30 MG tablet Take 1 tablet by mouth daily Yes Corina Boone,    POTASSIUM GLUCONATE ER PO Take 1 tablet by mouth daily Yes Historical Provider, MD   omeprazole (PRILOSEC) 20 MG delayed release capsule Take 20 mg by mouth daily Yes Historical Provider, MD   cetirizine (ZYRTEC) 10 MG tablet Take 10 mg by mouth daily Yes Historical Provider, MD   Multiple Vitamins-Minerals (THERAPEUTIC MULTIVITAMIN-MINERALS) tablet Take 2 tablets by mouth daily  Historical Provider, MD   clonazePAM (KLONOPIN) 0.5 MG tablet Take 1 tablet by mouth 2 times daily as needed for Anxiety for up to 30 days. Daniel Brown MD   SUMAtriptan (IMITREX) 100 MG tablet Take 1 tablet by mouth once as needed for Migraine One  Tablet    Twice  Daily  As  needed  Kylee Morris APRN - CNP        Allergies   Allergen Reactions    Codeine Nausea And Vomiting    Codeine Nausea And Vomiting       Past Medical History:   Diagnosis Date    Active smoker     Allergic rhinitis     Anxiety     Complicated migraine     Depression     Dizzy     Edema of extremities     mild    Folliculitis     lower chin    GERD (gastroesophageal reflux disease)     Head ache     Headache(784.0)     migraines    Hiatal hernia     Hyperlipidemia     Knee pain, right     intermittent    Myopia with astigmatism and presbyopia     Obesity     PONV (postoperative nausea and vomiting)     Sleep difficulties     Urinary incontinence        Past Surgical History:   Procedure Laterality Date    APPENDECTOMY      DILATION AND CURETTAGE OF UTERUS  1992    ENDOSCOPY, COLON,

## 2018-10-30 ENCOUNTER — TELEPHONE (OUTPATIENT)
Dept: PRIMARY CARE CLINIC | Age: 53
End: 2018-10-30

## 2018-11-06 ENCOUNTER — OFFICE VISIT (OUTPATIENT)
Dept: FAMILY MEDICINE CLINIC | Age: 53
End: 2018-11-06

## 2018-11-06 VITALS
WEIGHT: 174 LBS | BODY MASS INDEX: 34.16 KG/M2 | SYSTOLIC BLOOD PRESSURE: 108 MMHG | DIASTOLIC BLOOD PRESSURE: 70 MMHG | OXYGEN SATURATION: 100 % | HEART RATE: 91 BPM | HEIGHT: 60 IN

## 2018-11-06 DIAGNOSIS — G47.9 SLEEP DIFFICULTIES: ICD-10-CM

## 2018-11-06 DIAGNOSIS — F43.81 GRIEF REACTION WITH PROLONGED BEREAVEMENT: ICD-10-CM

## 2018-11-06 DIAGNOSIS — F41.9 ANXIETY: Primary | ICD-10-CM

## 2018-11-06 PROCEDURE — 99214 OFFICE O/P EST MOD 30 MIN: CPT | Performed by: NURSE PRACTITIONER

## 2018-11-06 ASSESSMENT — ENCOUNTER SYMPTOMS
EYES NEGATIVE: 1
ALLERGIC/IMMUNOLOGIC NEGATIVE: 1
VOMITING: 0
DIARRHEA: 0
SHORTNESS OF BREATH: 0
CHEST TIGHTNESS: 0
COUGH: 0
ABDOMINAL PAIN: 0
TROUBLE SWALLOWING: 0
GASTROINTESTINAL NEGATIVE: 1
CONSTIPATION: 0
SINUS PRESSURE: 0
RESPIRATORY NEGATIVE: 1
NAUSEA: 0

## 2019-07-15 DIAGNOSIS — M25.562 CHRONIC PAIN OF LEFT KNEE: Primary | ICD-10-CM

## 2019-07-15 DIAGNOSIS — G89.29 CHRONIC PAIN OF LEFT KNEE: Primary | ICD-10-CM

## 2019-07-23 ENCOUNTER — HOSPITAL ENCOUNTER (OUTPATIENT)
Dept: GENERAL RADIOLOGY | Age: 54
Discharge: HOME | End: 2019-07-25

## 2019-07-23 ENCOUNTER — OFFICE VISIT (OUTPATIENT)
Dept: ORTHOPEDIC SURGERY | Age: 54
End: 2019-07-23

## 2019-07-23 VITALS
BODY MASS INDEX: 34.16 KG/M2 | HEIGHT: 60 IN | DIASTOLIC BLOOD PRESSURE: 72 MMHG | HEART RATE: 76 BPM | SYSTOLIC BLOOD PRESSURE: 136 MMHG | WEIGHT: 174 LBS

## 2019-07-23 DIAGNOSIS — G89.29 CHRONIC PAIN OF LEFT KNEE: ICD-10-CM

## 2019-07-23 DIAGNOSIS — M25.562 CHRONIC PAIN OF LEFT KNEE: ICD-10-CM

## 2019-07-23 DIAGNOSIS — M17.12 PRIMARY OSTEOARTHRITIS OF LEFT KNEE: Primary | ICD-10-CM

## 2019-07-23 PROCEDURE — 20610 DRAIN/INJ JOINT/BURSA W/O US: CPT | Performed by: NURSE PRACTITIONER

## 2019-07-23 PROCEDURE — 73562 X-RAY EXAM OF KNEE 3: CPT

## 2019-07-23 PROCEDURE — 99212 OFFICE O/P EST SF 10 MIN: CPT | Performed by: NURSE PRACTITIONER

## 2019-07-23 RX ORDER — BUPIVACAINE HYDROCHLORIDE 5 MG/ML
5 INJECTION, SOLUTION PERINEURAL ONCE
Status: COMPLETED | OUTPATIENT
Start: 2019-07-23 | End: 2019-07-23

## 2019-07-23 RX ORDER — TRIAMCINOLONE ACETONIDE 40 MG/ML
80 INJECTION, SUSPENSION INTRA-ARTICULAR; INTRAMUSCULAR ONCE
Status: COMPLETED | OUTPATIENT
Start: 2019-07-23 | End: 2019-07-23

## 2019-07-23 RX ADMIN — BUPIVACAINE HYDROCHLORIDE 25 MG: 5 INJECTION, SOLUTION PERINEURAL at 13:07

## 2019-07-23 RX ADMIN — TRIAMCINOLONE ACETONIDE 80 MG: 40 INJECTION, SUSPENSION INTRA-ARTICULAR; INTRAMUSCULAR at 13:06

## 2019-07-24 NOTE — PROGRESS NOTES
[facility]  [address]  [place] [address]  Dept: Channing French: [phone]    Date of Service:  7/23/2019    Patrice Mccarthy  YOB: 1965  MRN: [id number]    CHIEF COMPLAINT:  The patient is here for left knee pain. Ga Natarajan is a 49 y.o. female who comes in today with left knee pain. She was seen back in April 2018 where she did receive a corticosteroid injection at that time which states did last until now. She is here today for further evaluation. The patient states that she does have increased pain with getting up from a seated position, also stiffness in the knee with extension after prolonged flexion. Pain increased with weightbearing and swelling to the knee. PHYSICAL EXAM:  General:  This is a well-developed, well-nourished female, alert and oriented x3, calm, cooperative, in no acute distress. On physical exam of the left lower extremity, skin is intact. No rashes, lesions or drainage. Mild joint effusion noted here today. Knee extension is full, flexion around 105 degrees. Good stability to varus and valgus stress testing. She denies any calf pain. Sensation intact. Neurovascularly intact to the left foot. X-RAYS:   Reviewed. Three views of the left knee do show bone-on-bone osteoarthritis to the lateral portion of the knee and also some patellofemoral osteoarthritis. IMPRESSION:     Primary osteoarthritis of left knee    -  Primary    Chronic pain of left knee         PLAN:  At this time, we did discuss options with patient. She would like to proceed with another corticosteroid injection since it did last her over a year last time. We will see her back as needed for continued pain. PROCEDURE NOTE:  After verbal consent was obtained, the left knee was prepped in sterile fashion with Betadine and alcohol.  A 22-gauge needle was introduced into the left inferolateral portal of the knee and 5 mL of 0.5%

## 2024-09-15 ENCOUNTER — HOSPITAL ENCOUNTER (EMERGENCY)
Age: 59
Discharge: HOME | End: 2024-09-15
Attending: EMERGENCY MEDICINE

## 2024-09-15 VITALS
OXYGEN SATURATION: 96 % | TEMPERATURE: 99.9 F | RESPIRATION RATE: 15 BRPM | DIASTOLIC BLOOD PRESSURE: 99 MMHG | HEART RATE: 82 BPM | SYSTOLIC BLOOD PRESSURE: 145 MMHG

## 2024-09-15 DIAGNOSIS — K02.9 PAIN DUE TO DENTAL CARIES: Primary | ICD-10-CM

## 2024-09-15 PROCEDURE — 99283 EMERGENCY DEPT VISIT LOW MDM: CPT

## 2024-09-15 RX ORDER — CLINDAMYCIN HCL 150 MG
300 CAPSULE ORAL 4 TIMES DAILY
Qty: 56 CAPSULE | Refills: 0 | Status: SHIPPED | OUTPATIENT
Start: 2024-09-15 | End: 2024-09-22

## 2024-09-15 RX ORDER — IBUPROFEN 800 MG/1
800 TABLET, FILM COATED ORAL EVERY 8 HOURS PRN
Qty: 30 TABLET | Refills: 0 | Status: SHIPPED | OUTPATIENT
Start: 2024-09-15

## 2024-09-15 ASSESSMENT — LIFESTYLE VARIABLES: HOW OFTEN DO YOU HAVE A DRINK CONTAINING ALCOHOL: NEVER
